# Patient Record
Sex: MALE | Race: WHITE
[De-identification: names, ages, dates, MRNs, and addresses within clinical notes are randomized per-mention and may not be internally consistent; named-entity substitution may affect disease eponyms.]

---

## 2017-08-01 ENCOUNTER — HOSPITAL ENCOUNTER (EMERGENCY)
Dept: HOSPITAL 8 - ED | Age: 22
Discharge: HOME | End: 2017-08-01
Payer: MEDICAID

## 2017-08-01 VITALS — SYSTOLIC BLOOD PRESSURE: 123 MMHG | DIASTOLIC BLOOD PRESSURE: 78 MMHG

## 2017-08-01 VITALS — HEIGHT: 67 IN | WEIGHT: 136.47 LBS | BODY MASS INDEX: 21.42 KG/M2

## 2017-08-01 DIAGNOSIS — R07.89: Primary | ICD-10-CM

## 2017-08-01 LAB
BUN SERPL-MCNC: 17 MG/DL (ref 7–18)
IS PT STATUS REG ER OR PRE ER?: YES

## 2017-08-01 PROCEDURE — 85025 COMPLETE CBC W/AUTO DIFF WBC: CPT

## 2017-08-01 PROCEDURE — 36415 COLL VENOUS BLD VENIPUNCTURE: CPT

## 2017-08-01 PROCEDURE — 93005 ELECTROCARDIOGRAM TRACING: CPT

## 2017-08-01 PROCEDURE — 93306 TTE W/DOPPLER COMPLETE: CPT

## 2017-08-01 PROCEDURE — 82040 ASSAY OF SERUM ALBUMIN: CPT

## 2017-08-01 PROCEDURE — 80048 BASIC METABOLIC PNL TOTAL CA: CPT

## 2017-08-01 PROCEDURE — 71020: CPT

## 2017-08-01 PROCEDURE — 84484 ASSAY OF TROPONIN QUANT: CPT

## 2019-04-03 ENCOUNTER — HOSPITAL ENCOUNTER (OUTPATIENT)
Dept: HOSPITAL 8 - RAD | Age: 24
Discharge: HOME | End: 2019-04-03
Attending: FAMILY MEDICINE
Payer: COMMERCIAL

## 2019-04-03 DIAGNOSIS — R10.32: Primary | ICD-10-CM

## 2019-04-03 PROCEDURE — 74177 CT ABD & PELVIS W/CONTRAST: CPT

## 2019-12-13 ENCOUNTER — OFFICE VISIT (OUTPATIENT)
Dept: URGENT CARE | Facility: CLINIC | Age: 24
End: 2019-12-13
Payer: COMMERCIAL

## 2019-12-13 VITALS
HEART RATE: 85 BPM | DIASTOLIC BLOOD PRESSURE: 68 MMHG | BODY MASS INDEX: 24.44 KG/M2 | HEIGHT: 69 IN | SYSTOLIC BLOOD PRESSURE: 114 MMHG | OXYGEN SATURATION: 97 % | WEIGHT: 165 LBS | RESPIRATION RATE: 15 BRPM | TEMPERATURE: 97.7 F

## 2019-12-13 DIAGNOSIS — R51.9 ACUTE NONINTRACTABLE HEADACHE, UNSPECIFIED HEADACHE TYPE: ICD-10-CM

## 2019-12-13 PROCEDURE — 99203 OFFICE O/P NEW LOW 30 MIN: CPT | Performed by: PHYSICIAN ASSISTANT

## 2019-12-13 ASSESSMENT — ENCOUNTER SYMPTOMS
SHORTNESS OF BREATH: 0
VOMITING: 0
HEADACHES: 1
NAUSEA: 0
FEVER: 0
NUMBNESS: 0
PHOTOPHOBIA: 0
SORE THROAT: 0
MYALGIAS: 0
RHINORRHEA: 0
TINGLING: 0
WEAKNESS: 0
MIGRAINE HEADACHES: 1
BLURRED VISION: 1
COUGH: 0

## 2019-12-14 NOTE — PROGRESS NOTES
Subjective:      Timbo Ramos is a 23 y.o. male who presents with Headache        Headache    This is a new problem. Episode onset: x 6 days ago (gradually) The problem occurs intermittently. The problem has been unchanged. The pain is located in the retro-orbital and vertex (right side) region. The pain does not radiate. The pain quality is similar to prior headaches. The quality of the pain is described as band-like and throbbing. The pain is mild. Associated symptoms include blurred vision (The patient reports slight blurred vision yesterday.  The patient states only his peripheral vision was affected.  The patient states the blurred vision lasted about 1 hour, now resolved.  The patient reports no complete vision loss.). Pertinent negatives include no coughing, ear pain, fever, nausea, numbness, photophobia, rhinorrhea, sore throat, tingling, vomiting or weakness. Nothing aggravates the symptoms. He has tried NSAIDs (OTC Headache Medication) for the symptoms. The treatment provided mild relief. His past medical history is significant for migraine headaches.     The patient reports a history of migraines as a teenager.  The patient states his previous migraines included blurred vision.  The patient states he has not experienced a migraine in the past few years.    The patient states his headache is currently resolved.    PMH:  has no past medical history on file.  MEDS: No current outpatient medications on file.  ALLERGIES: No Known Allergies  SURGHX: History reviewed. No pertinent surgical history.  SOCHX:  reports that he has never smoked. He has never used smokeless tobacco. He reports that he does not drink alcohol or use drugs.  FH: Family history was reviewed, no pertinent findings to report    Review of Systems   Constitutional: Negative for fever.   HENT: Negative for congestion, ear pain, rhinorrhea and sore throat.    Eyes: Positive for blurred vision (The patient reports slight blurred vision yesterday.   "The patient states only his peripheral vision was affected.  The patient states the blurred vision lasted about 1 hour, now resolved.  The patient reports no complete vision loss.). Negative for photophobia.   Respiratory: Negative for cough and shortness of breath.    Cardiovascular: Negative for chest pain and leg swelling.   Gastrointestinal: Negative for nausea and vomiting.   Musculoskeletal: Negative for myalgias.   Skin: Negative for rash.   Neurological: Positive for headaches (now resolved.). Negative for tingling, weakness and numbness.   All other systems reviewed and are negative.         Objective:     /68   Pulse 85   Temp 36.5 °C (97.7 °F) (Temporal)   Resp 15   Ht 1.753 m (5' 9\")   Wt 74.8 kg (165 lb)   SpO2 97%   BMI 24.37 kg/m²      Physical Exam  Constitutional:       General: He is not in acute distress.     Appearance: Normal appearance. He is not toxic-appearing.   HENT:      Head: Normocephalic and atraumatic.      Right Ear: Tympanic membrane, ear canal and external ear normal.      Left Ear: Tympanic membrane, ear canal and external ear normal.      Nose: Nose normal.      Mouth/Throat:      Mouth: Mucous membranes are moist.      Pharynx: Oropharynx is clear. No posterior oropharyngeal erythema.   Eyes:      Extraocular Movements: Extraocular movements intact.      Conjunctiva/sclera: Conjunctivae normal.   Neck:      Musculoskeletal: Normal range of motion and neck supple.   Cardiovascular:      Rate and Rhythm: Normal rate and regular rhythm.      Heart sounds: Normal heart sounds.   Pulmonary:      Effort: Pulmonary effort is normal. No respiratory distress.      Breath sounds: Normal breath sounds. No wheezing.   Musculoskeletal: Normal range of motion.   Skin:     General: Skin is warm and dry.   Neurological:      General: No focal deficit present.      Mental Status: He is alert and oriented to person, place, and time.      GCS: GCS eye subscore is 4. GCS verbal subscore " is 5. GCS motor subscore is 6.      Cranial Nerves: Cranial nerves are intact. No cranial nerve deficit.      Sensory: Sensation is intact.      Motor: Motor function is intact. No weakness.      Gait: Gait is intact. Gait normal.                 Assessment/Plan:     1. Acute nonintractable headache, unspecified headache type  - REFERRAL TO FOLLOW-UP WITH PRIMARY CARE    The patient's presenting symptoms and physical exam are consistent with an acute headache.  The patient's headache is likely related to a migraine.  The patient reports a history of migraines as a teenager.  The patient states his headache is currently resolved.  The patient's physical exam today in clinic was normal.  No focal neurological deficits were appreciated.  Based on the patient's presenting symptoms and physical exam findings, is unlikely the patient's symptoms are due to an acute neurological process.  Recommend OTC medications and supportive care for symptomatic management.  Will refer the patient to follow-up with primary care for further evaluation of his headache should they become more frequent/persistent.  Discussed strict ED precautions with the patient, and he verbalized understanding.    Differential diagnoses, supportive care, and indications for immediate follow-up discussed with patient.   Instructed to return to clinic or nearest emergency department for any change in condition, further concerns, or worsening of symptoms.    OTC Tylenol or Motrin for fever/discomfort.  Drink plenty of fluids  Referral to follow-up with primary care  Return to clinic or go to the ED if symptoms worsen or fail to improve, or if patient develop worsening/increasing/persistent headache, vision changes, numbness, tingling, or weakness to his extremities, neck pain/stiffness, nausea/vomiting, fever/chills, and/or any concerning symptoms.    Discussed plan with the patient, and he agrees to the above.

## 2019-12-24 ENCOUNTER — HOSPITAL ENCOUNTER (EMERGENCY)
Facility: MEDICAL CENTER | Age: 24
End: 2019-12-24
Attending: EMERGENCY MEDICINE
Payer: COMMERCIAL

## 2019-12-24 VITALS
OXYGEN SATURATION: 99 % | SYSTOLIC BLOOD PRESSURE: 114 MMHG | HEIGHT: 67 IN | DIASTOLIC BLOOD PRESSURE: 72 MMHG | WEIGHT: 147.71 LBS | HEART RATE: 62 BPM | TEMPERATURE: 98.6 F | RESPIRATION RATE: 16 BRPM | BODY MASS INDEX: 23.18 KG/M2

## 2019-12-24 DIAGNOSIS — L02.419 ABSCESS OF FOREARM: ICD-10-CM

## 2019-12-24 PROCEDURE — 99283 EMERGENCY DEPT VISIT LOW MDM: CPT

## 2019-12-24 PROCEDURE — 90471 IMMUNIZATION ADMIN: CPT

## 2019-12-24 PROCEDURE — 700111 HCHG RX REV CODE 636 W/ 250 OVERRIDE (IP): Performed by: EMERGENCY MEDICINE

## 2019-12-24 PROCEDURE — 87205 SMEAR GRAM STAIN: CPT

## 2019-12-24 PROCEDURE — 87070 CULTURE OTHR SPECIMN AEROBIC: CPT

## 2019-12-24 PROCEDURE — 700101 HCHG RX REV CODE 250: Performed by: EMERGENCY MEDICINE

## 2019-12-24 PROCEDURE — 303485 HCHG DRESSING MEDIUM

## 2019-12-24 PROCEDURE — 87077 CULTURE AEROBIC IDENTIFY: CPT

## 2019-12-24 PROCEDURE — 303977 HCHG I & D

## 2019-12-24 PROCEDURE — 90715 TDAP VACCINE 7 YRS/> IM: CPT | Performed by: EMERGENCY MEDICINE

## 2019-12-24 PROCEDURE — 87186 SC STD MICRODIL/AGAR DIL: CPT

## 2019-12-24 RX ORDER — LIDOCAINE HYDROCHLORIDE 20 MG/ML
20 INJECTION, SOLUTION INFILTRATION; PERINEURAL ONCE
Status: COMPLETED | OUTPATIENT
Start: 2019-12-24 | End: 2019-12-24

## 2019-12-24 RX ORDER — CEPHALEXIN 500 MG/1
500 TABLET ORAL 4 TIMES DAILY
Qty: 28 TAB | Refills: 0 | Status: SHIPPED | OUTPATIENT
Start: 2019-12-24 | End: 2019-12-31

## 2019-12-24 RX ORDER — SULFAMETHOXAZOLE AND TRIMETHOPRIM 800; 160 MG/1; MG/1
1 TABLET ORAL EVERY 12 HOURS
Qty: 20 TAB | Refills: 0 | Status: SHIPPED | OUTPATIENT
Start: 2019-12-24 | End: 2020-01-03

## 2019-12-24 RX ADMIN — CLOSTRIDIUM TETANI TOXOID ANTIGEN (FORMALDEHYDE INACTIVATED), CORYNEBACTERIUM DIPHTHERIAE TOXOID ANTIGEN (FORMALDEHYDE INACTIVATED), BORDETELLA PERTUSSIS TOXOID ANTIGEN (GLUTARALDEHYDE INACTIVATED), BORDETELLA PERTUSSIS FILAMENTOUS HEMAGGLUTININ ANTIGEN (FORMALDEHYDE INACTIVATED), BORDETELLA PERTUSSIS PERTACTIN ANTIGEN, AND BORDETELLA PERTUSSIS FIMBRIAE 2/3 ANTIGEN 0.5 ML: 5; 2; 2.5; 5; 3; 5 INJECTION, SUSPENSION INTRAMUSCULAR at 16:54

## 2019-12-24 RX ADMIN — LIDOCAINE HYDROCHLORIDE 20 ML: 20 INJECTION, SOLUTION INFILTRATION; PERINEURAL at 17:00

## 2019-12-24 NOTE — LETTER
12/27/2019               HuanJak Roperus  [address] [place] [address]        Dear Timbo (MR#[id number])    This letter is sent in regards to your, recent visit to the [facility] Emergency Department on 12/24/2019.  During the visit, tests were performed to assist the physician in a medical diagnosis.  A review of those tests requires that we notify you of the following:    Your wound culture was POSITIVE for a bacteria called Methicillin Sensitive Staphylococcus aureus. The antibiotic prescribed for you (sulfamethoxazole-trimethoprim and cephalexin) should be active to treat this bacteria. IT IS IMPORTANT THAT YOU CONTINUE TAKING YOUR ANTIBIOTIC UNTIL IT IS FINISHED.      Please feel free to contact me at the number below if you have any questions or concerns. Thank you for your cooperation in the matter.    Sincerely,  ED Culture Follow-Up Staff  Melissa Li, PharmD    [facility], Emergency Department  [address] [place] [id number]  [phone] (ED Culture Line)  [phone]

## 2019-12-25 ENCOUNTER — HOSPITAL ENCOUNTER (EMERGENCY)
Facility: MEDICAL CENTER | Age: 24
End: 2019-12-25
Attending: EMERGENCY MEDICINE
Payer: COMMERCIAL

## 2019-12-25 VITALS
BODY MASS INDEX: 23.18 KG/M2 | HEIGHT: 67 IN | SYSTOLIC BLOOD PRESSURE: 124 MMHG | TEMPERATURE: 98.4 F | WEIGHT: 147.71 LBS | OXYGEN SATURATION: 96 % | HEART RATE: 83 BPM | DIASTOLIC BLOOD PRESSURE: 77 MMHG | RESPIRATION RATE: 18 BRPM

## 2019-12-25 DIAGNOSIS — L02.413 ABSCESS OF RIGHT ARM: ICD-10-CM

## 2019-12-25 LAB
GRAM STN SPEC: NORMAL
SIGNIFICANT IND 70042: NORMAL
SITE SITE: NORMAL
SOURCE SOURCE: NORMAL

## 2019-12-25 PROCEDURE — 99284 EMERGENCY DEPT VISIT MOD MDM: CPT

## 2019-12-25 PROCEDURE — 700105 HCHG RX REV CODE 258: Performed by: EMERGENCY MEDICINE

## 2019-12-25 PROCEDURE — 700101 HCHG RX REV CODE 250: Performed by: EMERGENCY MEDICINE

## 2019-12-25 PROCEDURE — 96365 THER/PROPH/DIAG IV INF INIT: CPT

## 2019-12-25 RX ADMIN — DOXYCYCLINE 100 MG: 100 INJECTION, POWDER, LYOPHILIZED, FOR SOLUTION INTRAVENOUS at 01:32

## 2019-12-25 NOTE — ED NOTES
Dc instructions and prescriptions reviewed with pt. To f/u with pcp or [facility] in 3 days, as well as for s/s of infection

## 2019-12-25 NOTE — ED PROVIDER NOTES
ED Provider Note    CHIEF COMPLAINT  Chief Complaint   Patient presents with   • Elbow Pain       HPI  Timbo Gtz is a 24 y.o. male who presents planing of right elbow infection that is getting larger over the last few days.  The patient is not sure exactly how it happened but a few days ago he noticed a tender red lump on his dorsal aspect of his right elbow on the forearm.  The area has gotten larger and more tender.  He has some surrounding redness and tenderness to the forearm.  He denies any numbness or tingling to the hand or loss of  strength.  He has no fevers or chills.  He is not diabetic.  He cannot remember when his last tetanus was.    REVIEW OF SYSTEMS  No history of poor wound healing diabetes or bony abnormalities     PAST MEDICAL HISTORY  History reviewed. No pertinent past medical history.  Tetanus not up-to-date    SOCIAL HISTORY  Social History     Socioeconomic History   • Marital status: Single     Spouse name: Not on file   • Number of children: Not on file   • Years of education: Not on file   • Highest education level: Not on file   Occupational History   • Not on file   Social Needs   • Financial resource strain: Not on file   • Food insecurity:     Worry: Not on file     Inability: Not on file   • Transportation needs:     Medical: Not on file     Non-medical: Not on file   Tobacco Use   • Smoking status: Never Smoker   • Smokeless tobacco: Never Used   Substance and Sexual Activity   • Alcohol use: No   • Drug use: No   • Sexual activity: Not on file   Lifestyle   • Physical activity:     Days per week: Not on file     Minutes per session: Not on file   • Stress: Not on file   Relationships   • Social connections:     Talks on phone: Not on file     Gets together: Not on file     Attends Yazdanism service: Not on file     Active member of club or organization: Not on file     Attends meetings of clubs or organizations: Not on file     Relationship status: Not on file   •  "Intimate partner violence:     Fear of current or ex partner: Not on file     Emotionally abused: Not on file     Physically abused: Not on file     Forced sexual activity: Not on file   Other Topics Concern   • Not on file   Social History Narrative   • Not on file       CURRENT MEDICATIONS  Home Medications    **Home medications have not yet been reviewed for this encounter**         ALLERGIES  No Known Allergies    PHYSICAL EXAM  VITAL SIGNS: /72   Pulse 70   Temp 36.7 °C (98.1 °F) (Temporal)   Resp 18   Ht 1.702 m (5' 7\")   Wt 67 kg (147 lb 11.3 oz)   SpO2 98%   BMI 23.13 kg/m²    Constitutional: No distress  Skin: positive for red raised abscess about 3 cm x 2 cm with surrounding erythema area is warm and tender to palpation  Musculoskeletal: Range of motion with a pillow sions right arm with normal  strength and sensation.  He has the abscess as described above.  He has no swelling or pain with range of motion of the elbow joint itself.  Vascular: warm to touch good capillary refill   Neurologic: distally neurovascularly intact  Psychiatric: Affect normal        Medical Decision Making  Incision and Drainage Procedure Note    Indication: Abscess    Procedure: The patient was positioned appropriately and the skin over the incision site was prepped with betadine and draped in a sterile fashion. Local anesthesia was obtained by infiltration using 2% Lidocaine without epinephrine.  An incision was then made over the apex of the lesion and approximately 4 cc of purulent material was expressed. Loculations were broken up using forceps and more of the material was able to be expressed. The drainage cavity was then irrigated, packed with sterile gauze and dressed with a sterile dressing. The patient’s tetanus status was updated with a tetanus booster.    The patient tolerated the procedure well.    Complications: None      The patient will return for new or worsening symptoms and is stable at the time " of discharge.    The patient is referred to a primary physician for blood pressure management, diabetic screening, and for all other preventative health concerns.      DISPOSITION:  Patient will be discharged home in stable condition.    FOLLOW UP:  [address] [address] [phone]  In 3 days  For wound re-check, As needed      OUTPATIENT MEDICATIONS:  New Prescriptions    CEPHALEXIN 500 MG TAB    Take 1 Tab by mouth 4 times a day for 7 days.    SULFAMETHOXAZOLE-TRIMETHOPRIM (BACTRIM DS) 800-160 MG TABLET    Take 1 Tab by mouth every 12 hours for 10 days.           Patient has had high blood pressure while in the emergency department, felt likely secondary to medical condition. Counseled patient to monitor blood pressure at home and follow up with primary care physician.      Diagnosis  1. Abscess of forearm

## 2019-12-25 NOTE — ED TRIAGE NOTES
Patient BiB family for subjective fever after having abscessed on R arm drained today in this ED. Patient is not febrile in triage at this time. Patient was prescribed bactrim and keflex but instead took a left over tetracycline he had at home. He did not take any tylenol or ibuprofen.

## 2019-12-25 NOTE — ED PROVIDER NOTES
"ED Provider Note    CHIEF COMPLAINT  Chief Complaint   Patient presents with   • Fever       HPI  Timbo Gtz is a 24 y.o. male who presents for evaluation of subjective fevers.  Patient was seen earlier today for an incision and drainage of an abscess on his right forearm.  He was told to come back if he developed fevers and felt that he had 1 this afternoon although he did not measure it.  He currently has no fever and no chills.  He states he has been unable to get his prescription because his pharmacy was closed.  He did take a tetracycline pill he obtained through a family member although the dosage was unclear.  Patient has had no interval increase in the swelling or redness of his forearm and denies any increase in pain.    REVIEW OF SYSTEMS  Constitutional: No measured fevers, weakness, or chills  Skin: Swelling, redness, and recently incised and drained abscess to right  Pulm: No shortness of breath, or cough  Gastrointestinal: No nausea, vomiting, or diarrhea  Musculoskeletal: No recent trauma.  Pain and swelling noted to the right forearm  Neurologic: No sensory or motor changes to affected extremity  Immuno: No hx of recurrent infections      PAST MEDICAL HISTORY   Right forearm abscess    SOCIAL HISTORY  Social History     Tobacco Use   • Smoking status: Never Smoker   • Smokeless tobacco: Never Used   Substance and Sexual Activity   • Alcohol use: No   • Drug use: No   • Sexual activity: Not on file       SURGICAL HISTORY  patient denies any surgical history    CURRENT MEDICATIONS  Home Medications    **Home medications have not yet been reviewed for this encounter**         ALLERGIES  No Known Allergies    PHYSICAL EXAM  VITAL SIGNS: /77   Pulse 83   Temp 36.9 °C (98.4 °F) (Temporal)   Resp 18   Ht 1.702 m (5' 7\")   Wt 67 kg (147 lb 11.3 oz)   SpO2 96%   BMI 23.13 kg/m²    Gen: Alert in no apparent distress.  Lymphatic: No cervical or significant right axillary " lymphadenopathy noted.   Cardiovascular: Regular rate and rhythm, no murmurs.  Capillary refill less than 3 seconds to affected arm, 2+ distal pulse to affected arm  Thorax & Lungs: Normal breath sounds, No respiratory distress.  Skin: Warm, Dry.  Swelling, erythema, and induration noted to right dorsal forearm diffusely.  Dressing is clean dry and intact  Extremities: Intact distal pulses to affected extremity      COURSE & MEDICAL DECISION MAKING  Pertinent Labs & Imaging studies reviewed. (See chart for details)  Patient's presentation is inconsistent with any new emergent process.  There is no measured fever today.  He does not have any lymphangitis or adenopathy in his right axilla and I feel he is safe for discharge provided he can get his antibiotics.  He was encouraged to go to 1 of the pharmacies that actually is open and fill his prescription.  He was given a dose prior to discharge.  I did not feel any further labs or imaging would benefit the patient or .  He will continue with his previously described discharge instructions.    FINAL IMPRESSION  1. Abscess of right arm        Electronically signed by: Satnam Davis, 12/25/2019 1:26 AM

## 2019-12-25 NOTE — ED NOTES
Right arm abscess that was drained earlier today in ED.  Patient given ABX and did not get filled.  Patient took  Tetracycline from a friend instead.  States fever but did not take temp at home.

## 2019-12-26 LAB
BACTERIA WND AEROBE CULT: ABNORMAL
BACTERIA WND AEROBE CULT: ABNORMAL
GRAM STN SPEC: ABNORMAL
SIGNIFICANT IND 70042: ABNORMAL
SITE SITE: ABNORMAL
SOURCE SOURCE: ABNORMAL

## 2019-12-28 ENCOUNTER — OFFICE VISIT (OUTPATIENT)
Dept: URGENT CARE | Facility: CLINIC | Age: 24
End: 2019-12-28
Payer: COMMERCIAL

## 2019-12-28 VITALS
OXYGEN SATURATION: 99 % | SYSTOLIC BLOOD PRESSURE: 112 MMHG | TEMPERATURE: 98.1 F | HEIGHT: 69 IN | HEART RATE: 72 BPM | WEIGHT: 165 LBS | RESPIRATION RATE: 16 BRPM | DIASTOLIC BLOOD PRESSURE: 68 MMHG | BODY MASS INDEX: 24.44 KG/M2

## 2019-12-28 DIAGNOSIS — Z51.89 WOUND CHECK, ABSCESS: ICD-10-CM

## 2019-12-28 PROCEDURE — 99024 POSTOP FOLLOW-UP VISIT: CPT | Performed by: PHYSICIAN ASSISTANT

## 2019-12-28 RX ORDER — CEPHALEXIN 500 MG/1
CAPSULE ORAL
COMMUNITY
Start: 2019-12-25 | End: 2021-07-12

## 2019-12-29 ASSESSMENT — ENCOUNTER SYMPTOMS
CHILLS: 0
FEVER: 0
ROS SKIN COMMENTS: HEALING ABSCESS
NAUSEA: 0
VOMITING: 0

## 2019-12-29 NOTE — PROGRESS NOTES
"Subjective:      Timbo Gtz is a 24 y.o. male who presents with Other (FV on staph infection)            HPI  24-year-old male presents for wound check.  Patient had abscess drained in emergency department on 12/24/2019.  Patient has been changing dressing and packing at home.  Denies fevers, vomiting, or worsening signs of infection.  No increasing redness to wound.  Patient is taking Keflex and Bactrim.    Review of Systems   Constitutional: Negative for chills and fever.   Gastrointestinal: Negative for nausea and vomiting.   Skin:        Healing abscess       History reviewed. No pertinent past medical history.  Current Outpatient Medications on File Prior to Visit   Medication Sig Dispense Refill   • cephALEXin (KEFLEX) 500 MG Cap TAKE 1 CAPSULE BY MOUTH QID FOR 7 DAYS     • sulfamethoxazole-trimethoprim (BACTRIM DS) 800-160 MG tablet Take 1 Tab by mouth every 12 hours for 10 days. (Patient not taking: Reported on 12/28/2019) 20 Tab 0   • Cephalexin 500 MG Tab Take 1 Tab by mouth 4 times a day for 7 days. (Patient not taking: Reported on 12/28/2019) 28 Tab 0     No current facility-administered medications on file prior to visit.      No Known Allergies  Social History     Tobacco Use   • Smoking status: Never Smoker   • Smokeless tobacco: Never Used   Substance Use Topics   • Alcohol use: No      Objective:     /68   Pulse 72   Temp 36.7 °C (98.1 °F) (Temporal)   Resp 16   Ht 1.753 m (5' 9\")   Wt 74.8 kg (165 lb)   SpO2 99%   BMI 24.37 kg/m²      Physical Exam  Vitals signs reviewed.   Constitutional:       Appearance: Normal appearance.   Cardiovascular:      Rate and Rhythm: Normal rate.   Pulmonary:      Effort: Pulmonary effort is normal.   Skin:     Comments: Well-healing abscess over right forearm with no surrounding erythema or tenderness.   Neurological:      Mental Status: He is alert.   Psychiatric:         Mood and Affect: Mood normal.         Behavior: Behavior normal.    "      Thought Content: Thought content normal.         Judgment: Judgment normal.                 Assessment/Plan:     1. Wound check, abscess       Continue with previously prescribed Keflex and Bactrim, take as directed.  Return for recheck as needed.  Discussed red flags and reasons to return to UC or ED.  Pt and/or family verbalized understanding of diagnosis and follow up instructions and was offered informational handout on diagnosis.  PT discharged.

## 2021-04-03 ENCOUNTER — HOSPITAL ENCOUNTER (EMERGENCY)
Dept: HOSPITAL 8 - ED | Age: 26
LOS: 1 days | Discharge: HOME | End: 2021-04-04
Payer: COMMERCIAL

## 2021-04-03 VITALS — DIASTOLIC BLOOD PRESSURE: 69 MMHG | SYSTOLIC BLOOD PRESSURE: 117 MMHG

## 2021-04-03 VITALS — HEIGHT: 67 IN | WEIGHT: 137.79 LBS | BODY MASS INDEX: 21.63 KG/M2

## 2021-04-03 DIAGNOSIS — H10.31: Primary | ICD-10-CM

## 2021-04-03 PROCEDURE — 99283 EMERGENCY DEPT VISIT LOW MDM: CPT

## 2021-07-12 ENCOUNTER — OCCUPATIONAL MEDICINE (OUTPATIENT)
Dept: URGENT CARE | Facility: CLINIC | Age: 26
End: 2021-07-12
Payer: COMMERCIAL

## 2021-07-12 VITALS
RESPIRATION RATE: 16 BRPM | DIASTOLIC BLOOD PRESSURE: 70 MMHG | HEART RATE: 78 BPM | SYSTOLIC BLOOD PRESSURE: 110 MMHG | OXYGEN SATURATION: 95 % | TEMPERATURE: 98.2 F

## 2021-07-12 DIAGNOSIS — S05.8X1A ABRASION OF RIGHT EYE, INITIAL ENCOUNTER: ICD-10-CM

## 2021-07-12 LAB
AMP AMPHETAMINE: NORMAL
BAR BARBITURATES: NORMAL
BREATH ALCOHOL COMMENT: NORMAL
BZO BENZODIAZEPINES: NORMAL
COC COCAINE: NORMAL
INT CON NEG: NORMAL
INT CON POS: NORMAL
MDMA ECSTASY: NORMAL
MET METHAMPHETAMINES: NORMAL
MTD METHADONE: NORMAL
OPI OPIATES: NORMAL
OXY OXYCODONE: NORMAL
PCP PHENCYCLIDINE: NORMAL
POC BREATHALIZER: 0 PERCENT (ref 0–0.01)
POC URINE DRUG SCREEN OCDRS: NEGATIVE
THC: NORMAL

## 2021-07-12 PROCEDURE — 82075 ASSAY OF BREATH ETHANOL: CPT | Performed by: NURSE PRACTITIONER

## 2021-07-12 PROCEDURE — 99214 OFFICE O/P EST MOD 30 MIN: CPT | Performed by: NURSE PRACTITIONER

## 2021-07-12 PROCEDURE — 80305 DRUG TEST PRSMV DIR OPT OBS: CPT | Performed by: NURSE PRACTITIONER

## 2021-07-12 RX ORDER — POLYMYXIN B SULFATE AND TRIMETHOPRIM 1; 10000 MG/ML; [USP'U]/ML
1 SOLUTION OPHTHALMIC EVERY 4 HOURS
Qty: 10 ML | Refills: 0 | Status: SHIPPED | OUTPATIENT
Start: 2021-07-12 | End: 2023-07-20

## 2021-07-12 ASSESSMENT — ENCOUNTER SYMPTOMS
SHORTNESS OF BREATH: 0
SORE THROAT: 0
FEVER: 0
MYALGIAS: 0
PHOTOPHOBIA: 0
DIZZINESS: 0
EYE DISCHARGE: 1
DOUBLE VISION: 0
VOMITING: 0
CHILLS: 0
EYE REDNESS: 1
EYE PAIN: 1
NAUSEA: 0
BLURRED VISION: 0

## 2021-07-12 NOTE — LETTER
EMPLOYEE’S CLAIM FOR COMPENSATION/ REPORT OF INITIAL TREATMENT  FORM C-4    EMPLOYEE’S CLAIM - PROVIDE ALL INFORMATION REQUESTED   First Name  Timbo Last Name  Rachel Gtz Birthdate                    1995                Sex  male Claim Number   Home Address  [address] Age  25 y.o. Height   Weight   SSN     [facility] Zip  [address] Telephone  [phone] (home)    Mailing Address  [address]  [facility] Zip  [address] Primary Language Spoken  English    Insurer   Third Party   [employer]   Employee's Occupation (Job Title) When Injury or Occupational Disease Occurred      Employer's Name  [employer]  Telephone  [phone]    Employer Address  [address]  Zip  [address]    Date of Injury  7/12/2021               Hour of Injury  4:30 PM Date Employer Notified  7/12/2021 Last Day of Work after Injury     or Occupational Disease  7/12/2021 Supervisor to Whom Injury     Reported  [place]   Address or Location of Accident (if applicable)  [[place]]   What were you doing at the time of accident? (if applicable)  Working under vehicle    How did this injury or occupational disease occur? (Be specific an answer in detail. 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  N/A Witnesses to the Accident  David      Nature of Injury or Occupational Disease  Foreign Body  Part(s) of Body Injured or Affected  Eye (R), ,     I certify that the above is true and correct to the best of my knowledge and that I have provided this information in order to obtain the benefits of [place]  [place]  I hereby authorize any physician, chiropractor, surgeon, practitioner, or other person, any hospital, including [facility] or [facility] hospital, any medical service organization, any insurance company, or other institution or organization to release to each other, any medical or other information, including benefits paid or payable, pertinent to this injury or disease, except information relative to diagnosis, treatment and/or counseling for AIDS, psychological conditions, alcohol or controlled substances, for which I must give specific authorization.  A Photostat of this authorization shall be as valid as the original.     Date   Place   Employee’s Signature   THIS REPORT MUST BE COMPLETED AND MAILED WITHIN 3 WORKING DAYS OF TREATMENT   Place  [facility]  Name of [facility]   Date  7/12/2021 Diagnosis  (S05.8X1A) Abrasion of right eye, initial encounter Is there evidence the injured employee was under the              influence of alcohol and/or another controlled substance at the time of accident?   Hour  7:38 PM Description of Injury or Disease  The encounter diagnosis was Abrasion of right eye, initial encounter. No   Treatment  Patient be treated with an antibiotic eyedrop.  Encouraged to wear eye protection while work.  Follow-up in 3 days for reevaluation.  Anticipate discharge care at next visit  Have you advised the patient to remain off work five days or     more? No   X-Ray Findings      If Yes   From Date  To Date      From information given by the employee, together with medical evidence, can you directly connect this injury or occupational disease as job incurred?  Yes If No Full Duty    Yes Modified Duty      Is additional medical care by a physician indicated?  Yes If Modified Duty, Specify any Limitations / Restrictions      Do you know of any previous injury or disease contributing to this condition or  "occupational disease?                            No   Date  7/12/2021 Print Doctor’s Name   LANDON Vazquez I certify the employer’s copy of  this form was mailed on:   Address  [address]     [facility] Zip  [address]    Provider’s Tax ID Number  [id number] Telephone  Dept: [phone]      suleiman-TERRY Mistry  Signature:     Degree          ORIGINAL-TREATING PHYSICIAN OR CHIROPRACTOR    PAGE 2-INSURER/TPA    PAGE 3-EMPLOYER    PAGE 4-EMPLOYEE        Form C-4 (rev.10/07)           BRIEF DESCRIPTION OF RIGHTS AND BENEFITS  (Pursuant to [place] 616C.050)    Notice of Injury or Occupational Disease (Incident Report Form C-1): If an injury or occupational disease (OD) arises out of and in the course of employment, you must provide written notice to your employer as soon as practicable, but no later than 7 days after the accident or OD. Your employer shall maintain a sufficient supply of the required forms.    Claim for Compensation (Form C-4): If medical treatment is sought, the form C-4 is available at the place of initial treatment. A completed \"Claim for Compensation\" (Form C-4) must be filed within 90 days after an accident or OD. The treating physician or chiropractor must, within 3 working days after treatment, complete and mail to the employer, the employer's insurer and third-party , the Claim for Compensation.    Medical Treatment: If you require medical treatment for your on-the-job injury or OD, you may be required to select a physician or chiropractor from a list provided by your workers’ compensation insurer, if it has contracted with an Organization for Managed Care (MCO) or Preferred Provider Organization (PPO) or providers of health care. If your employer has not entered into a contract with an MCO or PPO, you may select a physician or chiropractor from the Panel of Physicians and Chiropractors. Any medical costs related to your " industrial injury or OD will be paid by your insurer.    Temporary Total Disability (TTD): If your doctor has certified that you are unable to work for a period of at least 5 consecutive days, or 5 cumulative days in a 20-day period, or places restrictions on you that your employer does not accommodate, you may be entitled to TTD compensation.    Temporary Partial Disability (TPD): If the wage you receive upon reemployment is less than the compensation for TTD to which you are entitled, the insurer may be required to pay you TPD compensation to make up the difference. TPD can only be paid for a maximum of 24 months.    Permanent Partial Disability (PPD): When your medical condition is stable and there is an indication of a PPD as a result of your injury or OD, within 30 days, your insurer must arrange for an evaluation by a rating physician or chiropractor to determine the degree of your PPD. The amount of your PPD award depends on the date of injury, the results of the PPD evaluation, your age and wage.    Permanent Total Disability (PTD): If you are medically certified by a treating physician or chiropractor as permanently and totally disabled and have been granted a PTD status by your insurer, you are entitled to receive monthly benefits not to exceed 66 2/3% of your average monthly wage. The amount of your PTD payments is subject to reduction if you previously received a lump-sum PPD award.    Vocational Rehabilitation Services: You may be eligible for vocational rehabilitation services if you are unable to return to the job due to a permanent physical impairment or permanent restrictions as a result of your injury or occupational disease.    Transportation and Per Alin Reimbursement: You may be eligible for travel expenses and per alin associated with medical treatment.    Reopening: You may be able to reopen your claim if your condition worsens after claim closure.     Appeal Process: If you disagree with a  written determination issued by the insurer or the insurer does not respond to your request, you may appeal to the Department of Administration, , by following the instructions contained in your determination letter. You must appeal the determination within 70 days from the date of the determination letter at [address], [address], [place], [place] [address], or [address], [address], [place], [place] [address]. If you disagree with the  decision, you may appeal to the Department of Administration, . You must file your appeal within 30 days from the date of the  decision letter at [address], [address], [place], [place] [address], or [address], [address], [place], [place] [address]. If you disagree with a decision of an , you may file a petition for judicial review with the District Court. You must do so within 30 days of the Appeal Officer’s decision. You may be represented by an  at your own expense or you may contact the [facility] for possible representation.    [place]  for Injured Workers (NAIW): If you disagree with a  decision, you may request that NAIW represent you without charge at an  Hearing. For information regarding denial of benefits, you may contact the [facility] at: [address], [address], [place] [address], [phone], or [address], [address], [place] [address], [phone]    To File a Complaint with the Division: If you wish to file a complaint with the  of the Division of Industrial Relations (DIR),  please contact the Workers’ Compensation Section, [address], [place], [place] [address], telephone ([phone]) [phone], or [address], [address], [place], [place] [address], telephone [phone]) [phone].    For assistance with Workers’ Compensation Issues: You may contact the [facility]  [place] Office for Consumer Health Assistance, [address] [place] [address], [address], [place] [address], Toll Free 1-310.491.6987, Web site: http://[facility].[place].gov/Programs/ENRIQUE E-mail: enrique@[email or web address].nv.[facility]              __________________________________________________________________                                    _________________            Employee Name / Signature                                                                                                                            Date                                                                                                                                                                                                                              D-2 (rev. 10/20)

## 2021-07-12 NOTE — LETTER
[facility] [place] [address] [address]  Phone:  [phone] - Fax:  [phone]   [facility] Progress Report and Disability Certification  Date of Service: 7/12/2021   No Show:  No  Date / Time of Next Visit: 7/15/2021 6:00 PM   Claim Information   Patient Name: Timbo Gtz  Claim Number:     Employer: [employer]  Date of Injury: 7/12/2021     Insurer / TPA: [employer]  ID / SSN:     Occupation:   Diagnosis: The encounter diagnosis was Abrasion of right eye, initial encounter.    Medical Information   Related to Industrial Injury?   Yes   Subjective Complaints:  DOI: 7/12/21: Patient is a 25-year-old male presents the urgent care for evaluation of a possible foreign body of the right eye.  Patient states that he was reaching up when he was installing a dry belt and debris fell into his eye.  He did initially flush his eye out thoroughly with water.  He feels as if it is possibly scratched.  He has no vision loss, has minimal amount of pain.  Does not wear contacts or glasses.   Objective Findings: Right eye: No periorbital erythema, no edema. PERRLA, conjunctiva injected.  Fluorescein and proparacaine applied, Woods lamp used for visualization, no foreign bodies, small abrasion of the 5:00 aspect.  Field gaze normal . Visual 20/15 R, L: 20/15, Both 20/15     Pre-Existing Condition(s):     Assessment:   Initial Visit    Status:    Permanent Disability:     Plan:   Comments:Patient be treated with an antibiotic eyedrop.  Encouraged to wear eye protection while work.  Follow-up in 3 days for reevaluation.  Anticipate discharge care at next visit    Diagnostics:      Comments:       Disability Information   Status: Released to Full Duty    From:  7/12/2021  Through: 7/15/2021 Restrictions are:     Physical Restrictions   Sitting:    Standing:    Stooping:    Bending:      Squatting:    Walking:    Climbing:     Pushing:      Pulling:    Other:    Reaching Above Shoulder (L):   Reaching Above Shoulder (R):       Reaching Below Shoulder (L):    Reaching Below Shoulder (R):      Not to exceed Weight Limits   Carrying(hrs):   Weight Limit(lb):   Lifting(hrs):   Weight  Limit(lb):     Comments:      Repetitive Actions   Hands: i.e. Fine Manipulations from Grasping:     Feet: i.e. Operating Foot Controls:     Driving / Operate Machinery:     Provider Name:   LANDON Vazquez Physician Signature:  Physician Name:     Cl[address] [place] [address] Clinic Phone Number: Dept: [phone]   Appointment Time: 7:45 Pm Visit Start Time: 7:38 PM   Check-In Time:  7:36 Pm Visit Discharge Time:  7:50 PM   Original-Treating Physician or Chiropractor    Page 2-Insurer/TPA    Page 3-Employer    Page 4-Employee

## 2021-07-13 NOTE — PROGRESS NOTES
Subjective:   Timbo Gtz  is a 25 y.o. male who presents for No chief complaint on file.    DOI: 7/12/21: Patient is a 25-year-old male presents the urgent care for evaluation of a possible foreign body of the right eye.  Patient states that he was reaching up when he was installing a dry belt and debris fell into his eye.  He did initially flush his eye out thoroughly with water.  He feels as if it is possibly scratched.  He has no vision loss, has minimal amount of pain.  Does not wear contacts or glasses.   HPI  Review of Systems   Constitutional: Negative for chills and fever.   HENT: Negative for sore throat.    Eyes: Positive for pain (fb sensation), discharge and redness. Negative for blurred vision, double vision and photophobia.   Respiratory: Negative for shortness of breath.    Cardiovascular: Negative for chest pain.   Gastrointestinal: Negative for nausea and vomiting.   Genitourinary: Negative for dysuria.   Musculoskeletal: Negative for myalgias.   Skin: Negative for rash.   Neurological: Negative for dizziness.     No Known Allergies   Objective:   /70   Pulse 78   Temp 36.8 °C (98.2 °F)   Resp 16   SpO2 95%   Physical Exam  Constitutional:       Appearance: Normal appearance. He is not ill-appearing or toxic-appearing.   HENT:      Head: Normocephalic.      Right Ear: External ear normal.      Left Ear: External ear normal.      Nose: Nose normal.      Mouth/Throat:      Lips: Pink.      Mouth: Mucous membranes are moist.   Eyes:      General: Lids are normal. Gaze aligned appropriately.         Right eye: No discharge.         Left eye: No discharge.      Extraocular Movements: Extraocular movements intact.      Conjunctiva/sclera:      Right eye: Right conjunctiva is injected.      Pupils:      Right eye: No corneal abrasion.     Pulmonary:      Effort: Pulmonary effort is normal. No accessory muscle usage or respiratory distress.   Musculoskeletal:         General: Normal  range of motion.      Cervical back: Full passive range of motion without pain.   Skin:     Coloration: Skin is not pale.   Neurological:      Mental Status: He is alert and oriented to person, place, and time.   Psychiatric:         Mood and Affect: Mood normal.         Thought Content: Thought content normal.       Right eye: No periorbital erythema, no edema. PERRLA, conjunctiva injected.  Fluorescein and proparacaine applied, Woods lamp used for visualization, no foreign bodies, small abrasion of the 5:00 aspect.  Field gaze normal . Visual 20/15 R, L: 20/15, Both 20/15     Assessment/Plan:   1. Abrasion of right eye, initial encounter  - polymixin-trimethoprim (POLYTRIM) 54316-1.1 UNIT/ML-% Solution; Administer 1 Drop into both eyes every 4 hours.  Dispense: 10 mL; Refill: 0  - POCT Breath Alcohol Test  - POCT 11 Panel Urine Drug Screen  Patient be treated with an antibiotic eyedrop.  Encouraged to wear eye protection while work.  Follow-up in 3 days for reevaluation.  Anticipate discharge care at next visit  Differential diagnosis, natural history, supportive care, and indications for immediate follow-up discussed.   My total time spent caring for the patient on the day of the encounter was 30 minutes.   This does not include time spent on separately billable procedures/tests.

## 2021-07-15 ENCOUNTER — OCCUPATIONAL MEDICINE (OUTPATIENT)
Dept: URGENT CARE | Facility: CLINIC | Age: 26
End: 2021-07-15
Payer: COMMERCIAL

## 2021-07-15 VITALS
RESPIRATION RATE: 16 BRPM | HEART RATE: 71 BPM | OXYGEN SATURATION: 97 % | BODY MASS INDEX: 22.63 KG/M2 | WEIGHT: 144.2 LBS | TEMPERATURE: 97.8 F | HEIGHT: 67 IN | DIASTOLIC BLOOD PRESSURE: 70 MMHG | SYSTOLIC BLOOD PRESSURE: 90 MMHG

## 2021-07-15 DIAGNOSIS — S05.01XD ABRASION OF RIGHT CORNEA, SUBSEQUENT ENCOUNTER: ICD-10-CM

## 2021-07-15 PROCEDURE — 99213 OFFICE O/P EST LOW 20 MIN: CPT | Performed by: PHYSICIAN ASSISTANT

## 2021-07-15 ASSESSMENT — VISUAL ACUITY: OU: 1

## 2021-07-15 NOTE — LETTER
[facility] [place] [address] [address]  Phone:  [phone] - Fax:  [phone]   [facility] Progress Report and Disability Certification  Date of Service: 7/15/2021   No Show:  No  Date / Time of Next Visit:     Claim Information   Patient Name: Timbo Gtz  Claim Number:     Employer: [employer]  Date of Injury: 7/12/2021     Insurer / TPA: [employer]  ID / SSN:     Occupation:   Diagnosis: The encounter diagnosis was Abrasion of right cornea, subsequent encounter.    Medical Information   Related to Industrial Injury? Yes    Subjective Complaints:  DOI: 7/12/2021.  Right corneal abrasion.  Symptoms greatly improved.  Very mild irritation and crusty discharge in the morning.  Denies pain, vision changes or foreign body sensation.  No new or concerning symptoms.  Using drops as directed.   Objective Findings: Physical Exam  Eyes:      General: Lids are normal. Lids are everted, no foreign bodies appreciated. Vision grossly intact.         Right eye: No foreign body or discharge.      Extraocular Movements: Extraocular movements intact.      Conjunctiva/sclera: Conjunctivae normal.      Right eye: Right conjunctiva is not injected. No exudate.     Pupils: Pupils are equal, round, and reactive to light.    Pre-Existing Condition(s):     Assessment:   Condition Improved    Status: Discharged /  MMI  Permanent Disability:No    Plan:      Diagnostics:      Comments:       Disability Information   Status: Released to Full Duty    From:     Through:   Restrictions are:     Physical Restrictions   Sitting:    Standing:    Stooping:    Bending:      Squatting:    Walking:    Climbing:    Pushing:      Pulling:    Other:    Reaching Above Shoulder (L):   Reaching Above Shoulder (R):       Reaching Below Shoulder (L):    Reaching Below Shoulder (R):      Not to exceed Weight Limits   Carrying(hrs):   Weight Limit(lb):    Lifting(hrs):   Weight  Limit(lb):     Comments:      Repetitive Actions   Hands: i.e. Fine Manipulations from Grasping:     Feet: i.e. Operating Foot Controls:     Driving / Operate Machinery:     Provider Name:   Neftali Tapia P.A.-C. Physician Signature:  Physician Name:     Clinic Name / Location: [place] [address]  [address] [place] [address] Clinic Phone Number: Dept: [phone]   Appointment Time: 6:00 Pm Visit Start Time: 5:54 PM   Check-In Time:  5:49 Pm Visit Discharge Time:  6:21 PM    Original-Treating Physician or Chiropractor    Page 2-Insurer/TPA    Page 3-Employer    Page 4-Employee

## 2021-07-16 NOTE — PROGRESS NOTES
"Subjective:      Timbo Gtz is a 25 y.o. male who presents with Follow-Up (right eye )      DOI: 7/12/2021.  Right corneal abrasion.  Symptoms greatly improved.  Very mild irritation and crusty discharge in the morning.  Denies pain, vision changes or foreign body sensation.  No new or concerning symptoms.  Using drops as directed.     HPI    ROS       Objective:     BP (!) 90/70   Pulse 71   Temp 36.6 °C (97.8 °F) (Temporal)   Resp 16   Ht 1.702 m (5' 7\")   Wt 65.4 kg (144 lb 3.2 oz)   SpO2 97%   BMI 22.58 kg/m²      Physical Exam  Eyes:      General: Lids are normal. Lids are everted, no foreign bodies appreciated. Vision grossly intact.         Right eye: No foreign body or discharge.      Extraocular Movements: Extraocular movements intact.      Conjunctiva/sclera: Conjunctivae normal.      Right eye: Right conjunctiva is not injected. No exudate.     Pupils: Pupils are equal, round, and reactive to light.         Physical Exam  Eyes:      General: Lids are normal. Lids are everted, no foreign bodies appreciated. Vision grossly intact.         Right eye: No foreign body or discharge.      Extraocular Movements: Extraocular movements intact.      Conjunctiva/sclera: Conjunctivae normal.      Right eye: Right conjunctiva is not injected. No exudate.     Pupils: Pupils are equal, round, and reactive to light.               Assessment/Plan:        1. Abrasion of right cornea, subsequent encounter       Symptoms greatly improved.  No vision changes or pain.  Exam unremarkable and vision normal.  Discharged MMI    Please note that this dictation was created using voice recognition software. I have made every reasonable attempt to correct obvious errors, but I expect that there are errors of grammar and possibly content that I did not discover before finalizing the note.    "

## 2021-12-10 NOTE — ED NOTES
"ED Positive Culture Follow-up/Notification Note:    Date: 12/27/19     Patient seen in the ED on 12/24/2019 for abscess on right elbow for one week. Per chart review the area had surrounding erythema and tenderness. I&D was performed and 4 mL of purulent material was expressed. Loculations were broken using forceps.  Patient returned to the ER on 12/25 for subjective fever and drainage from abscess. Patient reported that he didn't get the antibiotics filled because the pharmacy was closed. Patient received IV doxycycline in the ER on 12/25 and was encouraged to continue the cephalexin and bactrim as prescribed on 12/24.    1. Abscess of forearm       Discharge Medication List as of 12/24/2019  5:04 PM      START taking these medications    Details   sulfamethoxazole-trimethoprim (BACTRIM DS) 800-160 MG tablet Take 1 Tab by mouth every 12 hours for 10 days., Disp-20 Tab, R-0, Print Rx Paper      Cephalexin 500 MG Tab Take 1 Tab by mouth 4 times a day for 7 days., Disp-28 Tab, R-0, Print Rx Paper             Allergies: Patient has no known allergies.     Vitals:    12/24/19 1622 12/24/19 1624 12/24/19 1703   BP:  130/72 114/72   Pulse: 70  62   Resp: 18  16   Temp: 36.7 °C (98.1 °F)  37 °C (98.6 °F)   TempSrc: Temporal  Temporal   SpO2: 98%  99%   Weight: 67 kg (147 lb 11.3 oz)     Height: 1.702 m (5' 7\")         Final cultures:   Results     Procedure Component Value Units Date/Time    CULTURE WOUND W/ GRAM STAIN [[id number]]  (Abnormal)  (Susceptibility) Collected:  12/24/19 7539    Order Status:  Completed Specimen:  Wound from Abscess Updated:  12/26/19 1134     Significant Indicator POS     Source WND     Site Right Elbow     Culture Result -     Gram Stain Result No organisms seen.     Culture Result Staphylococcus aureus  Moderate growth      Susceptibility     Staphylococcus aureus (1)     Antibiotic Interpretation Microscan Method Status    Azithromycin Resistant >4 mcg/mL MIRIAM Final    Clindamycin Sensitive " [facility]  RAPID RESPONSE NOTE     Tiffanie Mederos Patient Status:  Inpatient    1945 MRN [id number]   [facility] 3SW-A Attending Shaw Charles MD   Hosp Day # 10 PCP Alexandra Kan MD     Reason for RRT: shock    Pt w/ hypo <=0.5 mcg/mL MIRIAM Final    Cefazolin Sensitive <=8 mcg/mL MIRIAM Final    Ceftaroline Sensitive <=0.5 mcg/mL MIRIAM Final    Daptomycin Sensitive <=1 mcg/mL MIRIAM Final    Ampicillin/sulbactam Sensitive <=8/4 mcg/mL MIRIAM Final    Erythromycin Resistant >4 mcg/mL MIRIAM Final    Vancomycin Sensitive 1 mcg/mL MIRIAM Final    Oxacillin Sensitive 1 mcg/mL MIRIAM Final    Penicillin Resistant >8 mcg/mL MIRIAM Final    Pip/Tazobactam Sensitive <=4 mcg/mL MIRIAM Final    Trimeth/Sulfa Sensitive <=0.5/9.5 mcg/mL MIRIAM Final    Tetracycline Sensitive <=4 mcg/mL MIRIAM Final                   GRAM STAIN [[id number]] Collected:  12/24/19 1655    Order Status:  Completed Specimen:  Wound Updated:  12/25/19 0626     Significant Indicator .     Source WND     Site Right Elbow     Gram Stain Result No organisms seen.          Plan:   Wound culture result MSSA  Sensitive to prescribed cephalexin and bactrim  Given that the patient had significant purulent drainage on the I&D on 12/24 and returned to the ER on 12/25 given worsening symptoms and fever, we will continue the combination of cephalexin and bactrim therapy for the complete duration   Appropriate antibiotic therapy prescribed. No changes required based upon culture result.  Sent letter to patient to notify of positive culture result and encourage compliance with prescribed antibiotics.     Melissa Li, Pharm.D., BCPS

## 2022-08-12 ENCOUNTER — TELEPHONE (OUTPATIENT)
Dept: HEALTH INFORMATION MANAGEMENT | Facility: OTHER | Age: 27
End: 2022-08-12

## 2022-08-12 NOTE — TELEPHONE ENCOUNTER
Outcome: Left Message    Please transfer to Patient Outreach Team at [phone] when patient returns call.        Attempt # 1

## 2022-08-19 NOTE — TELEPHONE ENCOUNTER
Outcome: Left Message    Please transfer to scheduling at [phone] when patient returns call.    Attempt # 2

## 2022-12-27 ENCOUNTER — APPOINTMENT (RX ONLY)
Dept: URBAN - METROPOLITAN AREA CLINIC 6 | Facility: CLINIC | Age: 27
Setting detail: DERMATOLOGY
End: 2022-12-27

## 2022-12-27 DIAGNOSIS — L82.1 OTHER SEBORRHEIC KERATOSIS: ICD-10-CM

## 2022-12-27 PROCEDURE — ? LIQUID NITROGEN

## 2022-12-27 PROCEDURE — ? COUNSELING

## 2022-12-27 ASSESSMENT — LOCATION DETAILED DESCRIPTION DERM
LOCATION DETAILED: SUPRAPUBIC SKIN
LOCATION DETAILED: GENITALIA

## 2022-12-27 ASSESSMENT — LOCATION ZONE DERM
LOCATION ZONE: GENITALIA
LOCATION ZONE: TRUNK

## 2022-12-27 ASSESSMENT — LOCATION SIMPLE DESCRIPTION DERM
LOCATION SIMPLE: GENITALIA
LOCATION SIMPLE: GROIN

## 2022-12-27 NOTE — PROCEDURE: LIQUID NITROGEN
Medical Necessity Clause: This procedure was medically necessary because the lesions that were treated were:
Medical Necessity Information: It is in your best interest to select a reason for this procedure from the list below. All of these items fulfill various CMS LCD requirements except the new and changing color options.
Post-Care Instructions: I reviewed with the patient in detail post-care instructions. Patient is to wear sunprotection, and avoid picking at any of the treated lesions. Pt may apply Vaseline to crusted or scabbing areas.
Render Post-Care Instructions In Note?: no
Duration Of Freeze Thaw-Cycle (Seconds): 0
Detail Level: Detailed
Show Spray Paint Technique Variable?: Yes
Spray Paint Text: The liquid nitrogen was applied to the skin utilizing a spray paint frosting technique.
Consent: The patient's consent was obtained including but not limited to risks of crusting, scabbing, blistering, scarring, darker or lighter pigmentary change, recurrence, incomplete removal and infection.

## 2023-07-19 SDOH — HEALTH STABILITY: PHYSICAL HEALTH: ON AVERAGE, HOW MANY MINUTES DO YOU ENGAGE IN EXERCISE AT THIS LEVEL?: 120 MIN

## 2023-07-19 SDOH — HEALTH STABILITY: PHYSICAL HEALTH: ON AVERAGE, HOW MANY DAYS PER WEEK DO YOU ENGAGE IN MODERATE TO STRENUOUS EXERCISE (LIKE A BRISK WALK)?: 5 DAYS

## 2023-07-19 SDOH — ECONOMIC STABILITY: FOOD INSECURITY: WITHIN THE PAST 12 MONTHS, THE FOOD YOU BOUGHT JUST DIDN'T LAST AND YOU DIDN'T HAVE MONEY TO GET MORE.: SOMETIMES TRUE

## 2023-07-19 SDOH — ECONOMIC STABILITY: HOUSING INSECURITY: IN THE LAST 12 MONTHS, HOW MANY PLACES HAVE YOU LIVED?: 1

## 2023-07-19 SDOH — ECONOMIC STABILITY: TRANSPORTATION INSECURITY
IN THE PAST 12 MONTHS, HAS THE LACK OF TRANSPORTATION KEPT YOU FROM MEDICAL APPOINTMENTS OR FROM GETTING MEDICATIONS?: NO

## 2023-07-19 SDOH — ECONOMIC STABILITY: HOUSING INSECURITY
IN THE LAST 12 MONTHS, WAS THERE A TIME WHEN YOU DID NOT HAVE A STEADY PLACE TO SLEEP OR SLEPT IN A SHELTER (INCLUDING NOW)?: NO

## 2023-07-19 SDOH — ECONOMIC STABILITY: INCOME INSECURITY: IN THE LAST 12 MONTHS, WAS THERE A TIME WHEN YOU WERE NOT ABLE TO PAY THE MORTGAGE OR RENT ON TIME?: NO

## 2023-07-19 SDOH — ECONOMIC STABILITY: INCOME INSECURITY: HOW HARD IS IT FOR YOU TO PAY FOR THE VERY BASICS LIKE FOOD, HOUSING, MEDICAL CARE, AND HEATING?: NOT VERY HARD

## 2023-07-19 SDOH — ECONOMIC STABILITY: FOOD INSECURITY: WITHIN THE PAST 12 MONTHS, YOU WORRIED THAT YOUR FOOD WOULD RUN OUT BEFORE YOU GOT MONEY TO BUY MORE.: NEVER TRUE

## 2023-07-19 SDOH — ECONOMIC STABILITY: TRANSPORTATION INSECURITY
IN THE PAST 12 MONTHS, HAS LACK OF TRANSPORTATION KEPT YOU FROM MEETINGS, WORK, OR FROM GETTING THINGS NEEDED FOR DAILY LIVING?: NO

## 2023-07-19 SDOH — HEALTH STABILITY: MENTAL HEALTH
STRESS IS WHEN SOMEONE FEELS TENSE, NERVOUS, ANXIOUS, OR CAN'T SLEEP AT NIGHT BECAUSE THEIR MIND IS TROUBLED. HOW STRESSED ARE YOU?: ONLY A LITTLE

## 2023-07-19 SDOH — ECONOMIC STABILITY: TRANSPORTATION INSECURITY
IN THE PAST 12 MONTHS, HAS LACK OF RELIABLE TRANSPORTATION KEPT YOU FROM MEDICAL APPOINTMENTS, MEETINGS, WORK OR FROM GETTING THINGS NEEDED FOR DAILY LIVING?: NO

## 2023-07-19 ASSESSMENT — SOCIAL DETERMINANTS OF HEALTH (SDOH)
HOW OFTEN DO YOU HAVE A DRINK CONTAINING ALCOHOL: 2-4 TIMES A MONTH
HOW HARD IS IT FOR YOU TO PAY FOR THE VERY BASICS LIKE FOOD, HOUSING, MEDICAL CARE, AND HEATING?: NOT VERY HARD
DO YOU BELONG TO ANY CLUBS OR ORGANIZATIONS SUCH AS CHURCH GROUPS UNIONS, FRATERNAL OR ATHLETIC GROUPS, OR SCHOOL GROUPS?: NO
WITHIN THE PAST 12 MONTHS, YOU WORRIED THAT YOUR FOOD WOULD RUN OUT BEFORE YOU GOT THE MONEY TO BUY MORE: NEVER TRUE
HOW MANY DRINKS CONTAINING ALCOHOL DO YOU HAVE ON A TYPICAL DAY WHEN YOU ARE DRINKING: 3 OR 4
HOW OFTEN DO YOU HAVE SIX OR MORE DRINKS ON ONE OCCASION: LESS THAN MONTHLY
IN A TYPICAL WEEK, HOW MANY TIMES DO YOU TALK ON THE PHONE WITH FAMILY, FRIENDS, OR NEIGHBORS?: MORE THAN THREE TIMES A WEEK
HOW OFTEN DO YOU ATTENT MEETINGS OF THE CLUB OR ORGANIZATION YOU BELONG TO?: NEVER
ARE YOU MARRIED, WIDOWED, DIVORCED, SEPARATED, NEVER MARRIED, OR LIVING WITH A PARTNER?: LIVING WITH PARTNER
HOW OFTEN DO YOU GET TOGETHER WITH FRIENDS OR RELATIVES?: MORE THAN THREE TIMES A WEEK
HOW OFTEN DO YOU GET TOGETHER WITH FRIENDS OR RELATIVES?: MORE THAN THREE TIMES A WEEK
ARE YOU MARRIED, WIDOWED, DIVORCED, SEPARATED, NEVER MARRIED, OR LIVING WITH A PARTNER?: LIVING WITH PARTNER
HOW OFTEN DO YOU ATTENT MEETINGS OF THE CLUB OR ORGANIZATION YOU BELONG TO?: NEVER
IN A TYPICAL WEEK, HOW MANY TIMES DO YOU TALK ON THE PHONE WITH FAMILY, FRIENDS, OR NEIGHBORS?: MORE THAN THREE TIMES A WEEK
DO YOU BELONG TO ANY CLUBS OR ORGANIZATIONS SUCH AS CHURCH GROUPS UNIONS, FRATERNAL OR ATHLETIC GROUPS, OR SCHOOL GROUPS?: NO
HOW OFTEN DO YOU ATTEND CHURCH OR RELIGIOUS SERVICES?: NEVER
HOW OFTEN DO YOU ATTEND CHURCH OR RELIGIOUS SERVICES?: NEVER

## 2023-07-19 ASSESSMENT — LIFESTYLE VARIABLES
SKIP TO QUESTIONS 9-10: 0
HOW OFTEN DO YOU HAVE A DRINK CONTAINING ALCOHOL: 2-4 TIMES A MONTH
AUDIT-C TOTAL SCORE: 4
HOW OFTEN DO YOU HAVE SIX OR MORE DRINKS ON ONE OCCASION: LESS THAN MONTHLY
HOW MANY STANDARD DRINKS CONTAINING ALCOHOL DO YOU HAVE ON A TYPICAL DAY: 3 OR 4

## 2023-07-20 ENCOUNTER — HOSPITAL ENCOUNTER (OUTPATIENT)
Dept: LAB | Facility: MEDICAL CENTER | Age: 28
End: 2023-07-20
Attending: FAMILY MEDICINE
Payer: COMMERCIAL

## 2023-07-20 ENCOUNTER — OFFICE VISIT (OUTPATIENT)
Dept: MEDICAL GROUP | Facility: PHYSICIAN GROUP | Age: 28
End: 2023-07-20
Payer: COMMERCIAL

## 2023-07-20 VITALS
RESPIRATION RATE: 16 BRPM | TEMPERATURE: 98.5 F | OXYGEN SATURATION: 96 % | WEIGHT: 157.6 LBS | HEIGHT: 67 IN | SYSTOLIC BLOOD PRESSURE: 108 MMHG | DIASTOLIC BLOOD PRESSURE: 62 MMHG | HEART RATE: 68 BPM | BODY MASS INDEX: 24.74 KG/M2

## 2023-07-20 DIAGNOSIS — Z11.59 NEED FOR HEPATITIS C SCREENING TEST: ICD-10-CM

## 2023-07-20 DIAGNOSIS — E55.9 VITAMIN D DEFICIENCY: ICD-10-CM

## 2023-07-20 DIAGNOSIS — D72.829 LEUKOCYTOSIS, UNSPECIFIED TYPE: ICD-10-CM

## 2023-07-20 DIAGNOSIS — R79.89 ELEVATED SERUM CREATININE: ICD-10-CM

## 2023-07-20 DIAGNOSIS — R82.90 ABNORMAL URINALYSIS: ICD-10-CM

## 2023-07-20 PROBLEM — G43.909 MIGRAINE WITHOUT STATUS MIGRAINOSUS, NOT INTRACTABLE: Status: ACTIVE | Noted: 2022-11-10

## 2023-07-20 LAB
25(OH)D3 SERPL-MCNC: 21 NG/ML (ref 30–100)
ALBUMIN SERPL BCP-MCNC: 4.6 G/DL (ref 3.2–4.9)
ALBUMIN/GLOB SERPL: 1.8 G/DL
ALP SERPL-CCNC: 81 U/L (ref 30–99)
ALT SERPL-CCNC: 25 U/L (ref 2–50)
ANION GAP SERPL CALC-SCNC: 12 MMOL/L (ref 7–16)
APPEARANCE UR: CLEAR
AST SERPL-CCNC: 25 U/L (ref 12–45)
BASOPHILS # BLD AUTO: 0.8 % (ref 0–1.8)
BASOPHILS # BLD: 0.04 K/UL (ref 0–0.12)
BILIRUB SERPL-MCNC: 0.2 MG/DL (ref 0.1–1.5)
BILIRUB UR QL STRIP.AUTO: NEGATIVE
BUN SERPL-MCNC: 17 MG/DL (ref 8–22)
CALCIUM ALBUM COR SERPL-MCNC: 8.6 MG/DL (ref 8.5–10.5)
CALCIUM SERPL-MCNC: 9.1 MG/DL (ref 8.5–10.5)
CHLORIDE SERPL-SCNC: 106 MMOL/L (ref 96–112)
CO2 SERPL-SCNC: 25 MMOL/L (ref 20–33)
COLOR UR: YELLOW
CREAT SERPL-MCNC: 1.31 MG/DL (ref 0.5–1.4)
EOSINOPHIL # BLD AUTO: 0.17 K/UL (ref 0–0.51)
EOSINOPHIL NFR BLD: 3.2 % (ref 0–6.9)
ERYTHROCYTE [DISTWIDTH] IN BLOOD BY AUTOMATED COUNT: 43.8 FL (ref 35.9–50)
FASTING STATUS PATIENT QL REPORTED: NORMAL
GFR SERPLBLD CREATININE-BSD FMLA CKD-EPI: 77 ML/MIN/1.73 M 2
GLOBULIN SER CALC-MCNC: 2.6 G/DL (ref 1.9–3.5)
GLUCOSE SERPL-MCNC: 85 MG/DL (ref 65–99)
GLUCOSE UR STRIP.AUTO-MCNC: NEGATIVE MG/DL
HCT VFR BLD AUTO: 43.4 % (ref 42–52)
HCV AB SER QL: NORMAL
HGB BLD-MCNC: 15.4 G/DL (ref 14–18)
IMM GRANULOCYTES # BLD AUTO: 0.01 K/UL (ref 0–0.11)
IMM GRANULOCYTES NFR BLD AUTO: 0.2 % (ref 0–0.9)
KETONES UR STRIP.AUTO-MCNC: NEGATIVE MG/DL
LEUKOCYTE ESTERASE UR QL STRIP.AUTO: NEGATIVE
LYMPHOCYTES # BLD AUTO: 1.98 K/UL (ref 1–4.8)
LYMPHOCYTES NFR BLD: 37.5 % (ref 22–41)
MCH RBC QN AUTO: 32.1 PG (ref 27–33)
MCHC RBC AUTO-ENTMCNC: 35.5 G/DL (ref 32.3–36.5)
MCV RBC AUTO: 90.4 FL (ref 81.4–97.8)
MICRO URNS: NORMAL
MONOCYTES # BLD AUTO: 0.45 K/UL (ref 0–0.85)
MONOCYTES NFR BLD AUTO: 8.5 % (ref 0–13.4)
NEUTROPHILS # BLD AUTO: 2.63 K/UL (ref 1.82–7.42)
NEUTROPHILS NFR BLD: 49.8 % (ref 44–72)
NITRITE UR QL STRIP.AUTO: NEGATIVE
NRBC # BLD AUTO: 0 K/UL
NRBC BLD-RTO: 0 /100 WBC (ref 0–0.2)
PH UR STRIP.AUTO: 7 [PH] (ref 5–8)
PLATELET # BLD AUTO: 234 K/UL (ref 164–446)
PMV BLD AUTO: 9.5 FL (ref 9–12.9)
POTASSIUM SERPL-SCNC: 4.4 MMOL/L (ref 3.6–5.5)
PROT SERPL-MCNC: 7.2 G/DL (ref 6–8.2)
PROT UR QL STRIP: NEGATIVE MG/DL
RBC # BLD AUTO: 4.8 M/UL (ref 4.7–6.1)
RBC UR QL AUTO: NEGATIVE
SODIUM SERPL-SCNC: 143 MMOL/L (ref 135–145)
SP GR UR STRIP.AUTO: 1.02
UROBILINOGEN UR STRIP.AUTO-MCNC: 0.2 MG/DL
WBC # BLD AUTO: 5.3 K/UL (ref 4.8–10.8)

## 2023-07-20 PROCEDURE — 3078F DIAST BP <80 MM HG: CPT | Performed by: FAMILY MEDICINE

## 2023-07-20 PROCEDURE — 99214 OFFICE O/P EST MOD 30 MIN: CPT | Performed by: FAMILY MEDICINE

## 2023-07-20 PROCEDURE — 3074F SYST BP LT 130 MM HG: CPT | Performed by: FAMILY MEDICINE

## 2023-07-20 PROCEDURE — 86803 HEPATITIS C AB TEST: CPT

## 2023-07-20 PROCEDURE — 85025 COMPLETE CBC W/AUTO DIFF WBC: CPT

## 2023-07-20 PROCEDURE — 36415 COLL VENOUS BLD VENIPUNCTURE: CPT

## 2023-07-20 PROCEDURE — 80053 COMPREHEN METABOLIC PANEL: CPT

## 2023-07-20 PROCEDURE — 81003 URINALYSIS AUTO W/O SCOPE: CPT

## 2023-07-20 PROCEDURE — 82306 VITAMIN D 25 HYDROXY: CPT

## 2023-07-20 RX ORDER — ONDANSETRON 4 MG/1
4 TABLET, ORALLY DISINTEGRATING ORAL
COMMUNITY
Start: 2023-06-03 | End: 2023-07-20

## 2023-07-20 RX ORDER — ONDANSETRON 4 MG/1
TABLET, ORALLY DISINTEGRATING ORAL
COMMUNITY
Start: 2023-06-03 | End: 2023-07-20

## 2023-07-20 ASSESSMENT — PATIENT HEALTH QUESTIONNAIRE - PHQ9: CLINICAL INTERPRETATION OF PHQ2 SCORE: 0

## 2023-07-20 ASSESSMENT — FIBROSIS 4 INDEX: FIB4 SCORE: 0.55

## 2023-07-20 NOTE — PROGRESS NOTES
"Subjective:     CC:   Chief Complaint   Patient presents with    [facility]       HPI:   Timbo presents today due to a ER visit in early June.  Patient was taking some supplements and was seen in the emergency room due to fact that he had pulled profuse vomiting.  Patient's white blood cell count was elevated along with his creatinine his urine was abnormal also.  Since then he has been avoiding the supplements and is feeling better.  Patient does have a history of migraine headaches in the past he states that he has been tried on different medication and it really did not work.    History reviewed. No pertinent past medical history.    Social History     Tobacco Use    Smoking status: Never    Smokeless tobacco: Never   Vaping Use    Vaping Use: Never used   Substance Use Topics    Alcohol use: Yes     Comment: Occ    Drug use: No       Current Outpatient Medications Ordered in Epic   Medication Sig Dispense Refill    polymixin-trimethoprim (POLYTRIM) 70767-0.1 UNIT/ML-% Solution Administer 1 Drop into both eyes every 4 hours. (Patient not taking: Reported on 7/20/2023) 10 mL 0     No current Epic-ordered facility-administered medications on file.       Allergies:  Patient has no known allergies.    Health Maintenance: Completed    ROS:  Gen: no fevers/chills, no changes in weight  Eyes: no changes in vision  ENT: no sore throat, no hearing loss, no bloody nose  Pulm: no sob, no cough  CV: no chest pain, no palpitations  GI: no nausea/vomiting, no diarrhea  : no dysuria  Neuro: no headaches, no numbness/tingling  Heme/Lymph: no easy bruising    Objective:     Exam:  /62 (BP Location: Left arm, Patient Position: Sitting, BP Cuff Size: Adult)   Pulse 68   Temp 36.9 °C (98.5 °F) (Temporal)   Resp 16   Ht 1.702 m (5' 7\")   Wt 71.5 kg (157 lb 9.6 oz)   SpO2 96%   BMI 24.68 kg/m²  Body mass index is 24.68 kg/m².    Gen: Alert and oriented, No apparent distress.  Skin: Warm and dry.  No obvious " lesions.  Eyes: Sclera wnl Pupils normal in size  Lungs: Normal effort, CTA bilaterally, no wheezes, rhonchi, or rales  CV: Regular rate and rhythm. No murmurs, rubs, or gallops.  ABD: Soft non-tender no organomegaly  Musculoskeletal: Normal gait. No extremity cyanosis, clubbing, or edema.  Neuro: Oriented to person, place and time  Psych: Mood is wnl     Labs: Labs should be nonfasting recommend he go ahead and get those done today    Assessment & Plan:     27 y.o. male with the following -     1. Leukocytosis, unspecified type  Recommend he repeat his lab work today  - CBC WITH DIFFERENTIAL; Future    2. Elevated serum creatinine  Patient will repeat his lab work today  - Comp Metabolic Panel; Future    3. Abnormal urinalysis  - URINALYSIS,CULTURE IF INDICATED; Future    4. Need for hepatitis C screening test  - HEP C VIRUS ANTIBODY; Future    5. Vitamin D deficiency  I recommend we check his vitamin D level  - VITAMIN D,25 HYDROXY (DEFICIENCY); Future       Return in about 3 weeks (around 8/10/2023), or if symptoms worsen or fail to improve.    Please note that this dictation was created using voice recognition software. I have made every reasonable attempt to correct obvious errors, but I expect that there are errors of grammar and possibly content that I did not discover before finalizing the note.

## 2023-08-25 ENCOUNTER — OFFICE VISIT (OUTPATIENT)
Dept: MEDICAL GROUP | Facility: PHYSICIAN GROUP | Age: 28
End: 2023-08-25
Payer: COMMERCIAL

## 2023-08-25 VITALS
SYSTOLIC BLOOD PRESSURE: 112 MMHG | HEIGHT: 67 IN | DIASTOLIC BLOOD PRESSURE: 64 MMHG | BODY MASS INDEX: 24.52 KG/M2 | OXYGEN SATURATION: 97 % | RESPIRATION RATE: 16 BRPM | WEIGHT: 156.2 LBS | TEMPERATURE: 97.5 F | HEART RATE: 71 BPM

## 2023-08-25 DIAGNOSIS — R79.89 ELEVATED SERUM CREATININE: ICD-10-CM

## 2023-08-25 DIAGNOSIS — E55.9 VITAMIN D DEFICIENCY: ICD-10-CM

## 2023-08-25 DIAGNOSIS — G43.809 OTHER MIGRAINE WITHOUT STATUS MIGRAINOSUS, NOT INTRACTABLE: ICD-10-CM

## 2023-08-25 DIAGNOSIS — D72.829 LEUKOCYTOSIS, UNSPECIFIED TYPE: ICD-10-CM

## 2023-08-25 DIAGNOSIS — M79.641 BILATERAL HAND PAIN: ICD-10-CM

## 2023-08-25 DIAGNOSIS — R82.90 ABNORMAL URINALYSIS: ICD-10-CM

## 2023-08-25 DIAGNOSIS — M79.642 BILATERAL HAND PAIN: ICD-10-CM

## 2023-08-25 PROCEDURE — 3078F DIAST BP <80 MM HG: CPT | Performed by: FAMILY MEDICINE

## 2023-08-25 PROCEDURE — 99214 OFFICE O/P EST MOD 30 MIN: CPT | Performed by: FAMILY MEDICINE

## 2023-08-25 PROCEDURE — 3074F SYST BP LT 130 MM HG: CPT | Performed by: FAMILY MEDICINE

## 2023-08-25 ASSESSMENT — FIBROSIS 4 INDEX: FIB4 SCORE: .5555555555555555556

## 2023-08-25 NOTE — PROGRESS NOTES
"Subjective:     CC: No chief complaint on file.      HPI:   Timbo presents today for follow-up patient also would like me to check his ears see things he may have earwax in them.  Patient also is having bilateral hand pain that comes and goes he has tried Voltaren cream but does not seem to help at times.  I did asked patient if he still having problems with headaches he says his headaches are diminishing and he is not having as much issues with that.    History reviewed. No pertinent past medical history.    Social History     Tobacco Use    Smoking status: Never    Smokeless tobacco: Never   Vaping Use    Vaping Use: Never used   Substance Use Topics    Alcohol use: Yes     Comment: Occ    Drug use: No       No current [facility]-ordered outpatient medications on file.     No current [facility]-ordered facility-administered medications on file.       Allergies:  Patient has no known allergies.    Health Maintenance: Completed    ROS:  Gen: no fevers/chills, no changes in weight  Eyes: no changes in vision  ENT: no sore throat, no hearing loss, no bloody nose  Pulm: no sob, no cough  CV: no chest pain, no palpitations  GI: no nausea/vomiting, no diarrhea  : no dysuria  Neuro: no headaches, no numbness/tingling  Heme/Lymph: no easy bruising    Objective:     Exam:  /64 (BP Location: Right arm, Patient Position: Sitting, BP Cuff Size: Adult)   Pulse 71   Temp 36.4 °C (97.5 °F) (Temporal)   Resp 16   Ht 1.702 m (5' 7\")   Wt 70.9 kg (156 lb 3.2 oz)   SpO2 97%   BMI 24.46 kg/m²  Body mass index is 24.46 kg/m².    Gen: Alert and oriented, No apparent distress.  Skin: Warm and dry.  No obvious lesions.  Eyes: Sclera wnl Pupils normal in size  ENT: Canals wnl and TM are not red  Neck: Neck is supple without lymphadenopathy.  Lungs: Normal effort, CTA bilaterally, no wheezes, rhonchi, or rales  CV: Regular rate and rhythm. No murmurs, rubs, or gallops.  Musculoskeletal: Normal gait. No extremity cyanosis, clubbing, or " edema.  Examination of hands patient able to open and make a fist with no problems I do not see any redness or swelling  Neuro: Oriented to person, place and time  Psych: Mood is wnl     Labs: I would recommend getting some nonfasting labs done concerning his hand issue.    Assessment & Plan:     27 y.o. male with the following -     1. Bilateral hand pain  I did tell patient that I will wait his lab results if they are everything is within normal limits may recommend he see Ortho I can always write a referral to sports med at [facility] to see if they can assess him I doubt this is carpal tunnel but they can evaluate him for this.  - CRP QUANTITIVE (NON-CARDIAC); Future  - Sed Rate; Future  - RHEUMATOID ARTHRITIS FACTOR; Future  - DEBORAH REFLEXIVE PROFILE; Future    2. Leukocytosis, unspecified type  The leukocytosis he had at that ER visit has completely resolved his white blood cell count is within normal limits    3. Elevated serum creatinine  Patient's creatinine is within normal limits not elevated at this time    4. Abnormal urinalysis  Since urine is within normal limits    5. Other migraine without status migrainosus, not intractable  At this time patient is not having as many issues with his headaches if he does have increased problems we will refer him to neurology    6. Vitamin D deficiency  Since vitamin D is low recommend he start taking 2000 IUs of vitamin D3 per day       Return if symptoms worsen or fail to improve.    Please note that this dictation was created using voice recognition software. I have made every reasonable attempt to correct obvious errors, but I expect that there are errors of grammar and possibly content that I did not discover before finalizing the note.

## 2025-04-27 ENCOUNTER — APPOINTMENT (OUTPATIENT)
Dept: RADIOLOGY | Facility: MEDICAL CENTER | Age: 30
End: 2025-04-27
Attending: STUDENT IN AN ORGANIZED HEALTH CARE EDUCATION/TRAINING PROGRAM
Payer: COMMERCIAL

## 2025-04-27 ENCOUNTER — HOSPITAL ENCOUNTER (EMERGENCY)
Facility: MEDICAL CENTER | Age: 30
End: 2025-04-27
Attending: STUDENT IN AN ORGANIZED HEALTH CARE EDUCATION/TRAINING PROGRAM
Payer: COMMERCIAL

## 2025-04-27 VITALS
SYSTOLIC BLOOD PRESSURE: 105 MMHG | TEMPERATURE: 97.8 F | HEART RATE: 78 BPM | RESPIRATION RATE: 18 BRPM | WEIGHT: 163.36 LBS | BODY MASS INDEX: 25.64 KG/M2 | OXYGEN SATURATION: 95 % | DIASTOLIC BLOOD PRESSURE: 54 MMHG | HEIGHT: 67 IN

## 2025-04-27 DIAGNOSIS — R10.9 RIGHT FLANK PAIN: ICD-10-CM

## 2025-04-27 DIAGNOSIS — R79.89 LFTS ABNORMAL: ICD-10-CM

## 2025-04-27 LAB
ALBUMIN SERPL BCP-MCNC: 4.5 G/DL (ref 3.2–4.9)
ALBUMIN/GLOB SERPL: 1.5 G/DL
ALP SERPL-CCNC: 87 U/L (ref 30–99)
ALT SERPL-CCNC: 187 U/L (ref 2–50)
ANION GAP SERPL CALC-SCNC: 14 MMOL/L (ref 7–16)
APPEARANCE UR: CLEAR
AST SERPL-CCNC: 63 U/L (ref 12–45)
BACTERIA #/AREA URNS HPF: NORMAL /HPF
BASOPHILS # BLD AUTO: 0.5 % (ref 0–1.8)
BASOPHILS # BLD: 0.05 K/UL (ref 0–0.12)
BILIRUB SERPL-MCNC: 0.2 MG/DL (ref 0.1–1.5)
BILIRUB UR QL STRIP.AUTO: NEGATIVE
BUN SERPL-MCNC: 25 MG/DL (ref 8–22)
CALCIUM ALBUM COR SERPL-MCNC: 9.1 MG/DL (ref 8.5–10.5)
CALCIUM SERPL-MCNC: 9.5 MG/DL (ref 8.5–10.5)
CASTS URNS QL MICRO: NORMAL /LPF (ref 0–2)
CHLORIDE SERPL-SCNC: 103 MMOL/L (ref 96–112)
CO2 SERPL-SCNC: 22 MMOL/L (ref 20–33)
COLOR UR: YELLOW
CREAT SERPL-MCNC: 1.32 MG/DL (ref 0.5–1.4)
EOSINOPHIL # BLD AUTO: 0.08 K/UL (ref 0–0.51)
EOSINOPHIL NFR BLD: 0.8 % (ref 0–6.9)
EPITHELIAL CELLS 1715: NORMAL /HPF (ref 0–5)
ERYTHROCYTE [DISTWIDTH] IN BLOOD BY AUTOMATED COUNT: 40.7 FL (ref 35.9–50)
GFR SERPLBLD CREATININE-BSD FMLA CKD-EPI: 75 ML/MIN/1.73 M 2
GLOBULIN SER CALC-MCNC: 3.1 G/DL (ref 1.9–3.5)
GLUCOSE SERPL-MCNC: 97 MG/DL (ref 65–99)
GLUCOSE UR STRIP.AUTO-MCNC: NEGATIVE MG/DL
HCT VFR BLD AUTO: 45.9 % (ref 42–52)
HGB BLD-MCNC: 16.4 G/DL (ref 14–18)
IMM GRANULOCYTES # BLD AUTO: 0.04 K/UL (ref 0–0.11)
IMM GRANULOCYTES NFR BLD AUTO: 0.4 % (ref 0–0.9)
KETONES UR STRIP.AUTO-MCNC: NEGATIVE MG/DL
LEUKOCYTE ESTERASE UR QL STRIP.AUTO: NEGATIVE
LIPASE SERPL-CCNC: 24 U/L (ref 11–82)
LYMPHOCYTES # BLD AUTO: 1.88 K/UL (ref 1–4.8)
LYMPHOCYTES NFR BLD: 19.1 % (ref 22–41)
MCH RBC QN AUTO: 32 PG (ref 27–33)
MCHC RBC AUTO-ENTMCNC: 35.7 G/DL (ref 32.3–36.5)
MCV RBC AUTO: 89.6 FL (ref 81.4–97.8)
MICRO URNS: ABNORMAL
MONOCYTES # BLD AUTO: 1.15 K/UL (ref 0–0.85)
MONOCYTES NFR BLD AUTO: 11.7 % (ref 0–13.4)
NEUTROPHILS # BLD AUTO: 6.65 K/UL (ref 1.82–7.42)
NEUTROPHILS NFR BLD: 67.5 % (ref 44–72)
NITRITE UR QL STRIP.AUTO: NEGATIVE
NRBC # BLD AUTO: 0 K/UL
NRBC BLD-RTO: 0 /100 WBC (ref 0–0.2)
PH UR STRIP.AUTO: 5.5 [PH] (ref 5–8)
PLATELET # BLD AUTO: 232 K/UL (ref 164–446)
PMV BLD AUTO: 9.2 FL (ref 9–12.9)
POTASSIUM SERPL-SCNC: 4 MMOL/L (ref 3.6–5.5)
PROT SERPL-MCNC: 7.6 G/DL (ref 6–8.2)
PROT UR QL STRIP: NEGATIVE MG/DL
RBC # BLD AUTO: 5.12 M/UL (ref 4.7–6.1)
RBC # URNS HPF: NORMAL /HPF (ref 0–2)
RBC UR QL AUTO: ABNORMAL
SODIUM SERPL-SCNC: 139 MMOL/L (ref 135–145)
SP GR UR STRIP.AUTO: <=1.005
UROBILINOGEN UR STRIP.AUTO-MCNC: 0.2 EU/DL
WBC # BLD AUTO: 9.9 K/UL (ref 4.8–10.8)
WBC #/AREA URNS HPF: NORMAL /HPF

## 2025-04-27 PROCEDURE — 36415 COLL VENOUS BLD VENIPUNCTURE: CPT

## 2025-04-27 PROCEDURE — 81001 URINALYSIS AUTO W/SCOPE: CPT

## 2025-04-27 PROCEDURE — 74176 CT ABD & PELVIS W/O CONTRAST: CPT

## 2025-04-27 PROCEDURE — 85025 COMPLETE CBC W/AUTO DIFF WBC: CPT

## 2025-04-27 PROCEDURE — 80053 COMPREHEN METABOLIC PANEL: CPT

## 2025-04-27 PROCEDURE — 99283 EMERGENCY DEPT VISIT LOW MDM: CPT

## 2025-04-27 PROCEDURE — 83690 ASSAY OF LIPASE: CPT

## 2025-04-27 ASSESSMENT — FIBROSIS 4 INDEX: FIB4 SCORE: .5982905982905982906

## 2025-04-27 NOTE — ED NOTES
Pt came in from triage awake and ambulatory, complaints of flank pain 8/10.  Pt denies n/v at this time.

## 2025-04-27 NOTE — ED TRIAGE NOTES
Pt ambulated into triage with c/o rt flank pain that wraps around to RLQ. Pt denies dysuria/hematuria. Pt sts he took two excedrin and drank an energy drink and then started having pain. Protocol orders placed. Pt is A&O and ambulatory. Placed in lobby pending ER room.

## 2025-04-27 NOTE — ED PROVIDER NOTES
"ER Provider Note    Scribed for Jayme Martinez D.o. by Saumya Linares. 4/27/2025  3:19 AM    Primary Care Provider: Billie Martinez M.D.    CHIEF COMPLAINT   Chief Complaint   Patient presents with    Flank Pain     Rt side     EXTERNAL RECORDS REVIEWED  No pertinent records available for review.    HPI/ROS  LIMITATION TO HISTORY   Select: : None  OUTSIDE HISTORIAN(S):  Family at bedside    Timbo Gtz is a 29 y.o. male who presents to the ED complaining of back pain onset about 7 PM. Patient reports associated nausea. He reports the pain wraps around to the right lower quadrant. Patient denies any dysuria, urinary incontinence, or vomiting. He denies any history of pain like this in the past. Patient denies any heavy lifting or strenuous activity today.    PAST MEDICAL HISTORY  History reviewed. No pertinent past medical history.    SURGICAL HISTORY  History reviewed. No pertinent surgical history.    FAMILY HISTORY  Family History   Problem Relation Age of Onset    Diabetes Mother     Heart Disease Maternal Grandmother        SOCIAL HISTORY   reports that he has never smoked. He has never used smokeless tobacco. He reports current alcohol use. He reports that he does not use drugs.    CURRENT MEDICATIONS  There are no discharge medications for this patient.      ALLERGIES  Patient has no known allergies.    PHYSICAL EXAM  /78   Pulse 72   Temp 36.6 °C (97.8 °F) (Temporal)   Resp 16   Ht 1.702 m (5' 7\")   Wt 74.1 kg (163 lb 5.8 oz)   SpO2 95%   BMI 25.59 kg/m²   Pulse oximetry interpretation: I interpret the pulse oximetry as normal.  Constitutional: Awake and alert. No acute distress.  Head: NCAT.  HEENT: Normal Conjunctiva.  Neck: Grossly normal range of motion. Airway midline.  Cardiovascular: Normal heart rate, Normal rhythm.  Thorax & Lungs: No respiratory distress. Clear to Auscultation bilaterally.  Abdomen: Normal inspection. Nontender. Nondistended  Skin: No obvious " rash.  Back: Mild right sided CVA tenderness.   Musculoskeletal: No obvious deformity. Moves all extremities Well.  Neurologic: A&Ox4.   Psychiatric: Mood and affect are appropriate for situation.    DIAGNOSTIC STUDIES    EKG/LABS  Results for orders placed or performed during the hospital encounter of 04/27/25   CBC WITH DIFFERENTIAL    Collection Time: 04/27/25  1:55 AM   Result Value Ref Range    WBC 9.9 4.8 - 10.8 K/uL    RBC 5.12 4.70 - 6.10 M/uL    Hemoglobin 16.4 14.0 - 18.0 g/dL    Hematocrit 45.9 42.0 - 52.0 %    MCV 89.6 81.4 - 97.8 fL    MCH 32.0 27.0 - 33.0 pg    MCHC 35.7 32.3 - 36.5 g/dL    RDW 40.7 35.9 - 50.0 fL    Platelet Count 232 164 - 446 K/uL    MPV 9.2 9.0 - 12.9 fL    Neutrophils-Polys 67.50 44.00 - 72.00 %    Lymphocytes 19.10 (L) 22.00 - 41.00 %    Monocytes 11.70 0.00 - 13.40 %    Eosinophils 0.80 0.00 - 6.90 %    Basophils 0.50 0.00 - 1.80 %    Immature Granulocytes 0.40 0.00 - 0.90 %    Nucleated RBC 0.00 0.00 - 0.20 /100 WBC    Neutrophils (Absolute) 6.65 1.82 - 7.42 K/uL    Lymphs (Absolute) 1.88 1.00 - 4.80 K/uL    Monos (Absolute) 1.15 (H) 0.00 - 0.85 K/uL    Eos (Absolute) 0.08 0.00 - 0.51 K/uL    Baso (Absolute) 0.05 0.00 - 0.12 K/uL    Immature Granulocytes (abs) 0.04 0.00 - 0.11 K/uL    NRBC (Absolute) 0.00 K/uL   COMP METABOLIC PANEL    Collection Time: 04/27/25  1:55 AM   Result Value Ref Range    Sodium 139 135 - 145 mmol/L    Potassium 4.0 3.6 - 5.5 mmol/L    Chloride 103 96 - 112 mmol/L    Co2 22 20 - 33 mmol/L    Anion Gap 14.0 7.0 - 16.0    Glucose 97 65 - 99 mg/dL    Bun 25 (H) 8 - 22 mg/dL    Creatinine 1.32 0.50 - 1.40 mg/dL    Calcium 9.5 8.5 - 10.5 mg/dL    Correct Calcium 9.1 8.5 - 10.5 mg/dL    AST(SGOT) 63 (H) 12 - 45 U/L    ALT(SGPT) 187 (H) 2 - 50 U/L    Alkaline Phosphatase 87 30 - 99 U/L    Total Bilirubin 0.2 0.1 - 1.5 mg/dL    Albumin 4.5 3.2 - 4.9 g/dL    Total Protein 7.6 6.0 - 8.2 g/dL    Globulin 3.1 1.9 - 3.5 g/dL    A-G Ratio 1.5 g/dL   LIPASE     Collection Time: 04/27/25  1:55 AM   Result Value Ref Range    Lipase 24 11 - 82 U/L   ESTIMATED GFR    Collection Time: 04/27/25  1:55 AM   Result Value Ref Range    GFR (CKD-EPI) 75 >60 mL/min/1.73 m 2   URINALYSIS    Collection Time: 04/27/25  3:39 AM    Specimen: Urine   Result Value Ref Range    Color Yellow     Character Clear     Specific Gravity <=1.005 <1.035    Ph 5.5 5.0 - 8.0    Glucose Negative Negative mg/dL    Ketones Negative Negative mg/dL    Protein Negative Negative mg/dL    Bilirubin Negative Negative    Urobilinogen, Urine 0.2 <=1.0 EU/dL    Nitrite Negative Negative    Leukocyte Esterase Negative Negative    Occult Blood Trace (A) Negative    Micro Urine Req Microscopic    URINE MICROSCOPIC (W/UA)    Collection Time: 04/27/25  3:39 AM   Result Value Ref Range    WBC 0-2 /hpf    RBC 0-2 0 - 2 /hpf    Bacteria None Seen None /hpf    Epithelial Cells 0-2 0 - 5 /hpf    Urine Casts 0-2 0 - 2 /lpf     I have independently interpreted this EKG    RADIOLOGY/PROCEDURES   The attending emergency physician has independently interpreted the diagnostic imaging associated with this visit and am waiting the final reading from the radiologist.   My preliminary interpretation is a follows: No stone    Radiologist interpretation:  CT-RENAL COLIC EVALUATION(A/P W/O)   Final Result      No renal stone or acute process seen.          COURSE & MEDICAL DECISION MAKING     ASSESSMENT, COURSE AND PLAN  Care Narrative:   Nursing notes, VS, PMSFHx, labs, imaging, EKG reviewed in chart.     3:20 AM  29 y.o. YO male presents with flank pain somewhat subsiding but abrupt in onset this evening with radiation to the right lower quadrant/groin  Afebrile and normal vital signs  Pertinent exam findings include Mild right sided CVA tenderness.   Differentials considered but not exhaustive list including kidney stone, cholecystitis, back pain, pyelonephritis    Labs with no leukocytosis, CMP without derangements.  His kidney  function is normal range but notably high end of normal for his age.  LFTs nonspecifically elevated and patient reports a previous history of heavy drinking.  Urine returns with very minimal blood and on microscopic testing is actually negative for blood.  Nevertheless will obtain CT renal colic to assess for stone    He otherwise has not required medications for pain or nausea.  CT returns no stone, no acute findings, normal gallbladder.    CT returns with no stone.  Discussed etiologies including musculoskeletal pain, doubt cholecystitis, possibly resolved kidney stone.  Discussed monitoring symptoms return for reevaluation if worsening symptoms.    ADDITIONAL PROBLEM LIST  none    DISPOSITION AND DISCUSSIONS  I have discussed management of the patient with the following physicians and ABUNDIO's:  none    Discussion of management with other QHP or appropriate source(s): None     Escalation of care considered, and ultimately not performed: Laboratory analysis, diagnostic imaging, and acute inpatient care management, however at this time, the patient is most appropriate for outpatient management.    Barriers to care at this time, including but not limited to:  None .     Decision tools and prescription drugs considered including, but not limited to:  None .    FINAL DIAGNOSIS  1. Right flank pain    2. LFTs abnormal       Saumya MAJOR (Stephanie), am scribing for, and in the presence of, Jayme Martinez D.O..    Electronically signed by: Saumya Linares (Stephanie), 4/27/2025    Jayme MAJOR D.O. personally performed the services described in this documentation, as scribed by Saumya Linares in my presence, and it is both accurate and complete.    The note accurately reflects work and decisions made by me.  Jayme Martinez D.O.  4/27/2025  6:50 AM

## 2025-04-27 NOTE — ED NOTES
Reassessed by ERP    Pt is for discharge, home instructions advised, follow up instructions and health education imparted. Patient verbalized understanding.  Pt left ED awake and ambulatory on steady gait.